# Patient Record
(demographics unavailable — no encounter records)

---

## 2024-11-18 NOTE — ASSESSMENT
[FreeTextEntry1] : 46 year old male found to have stable Hypertension, Hypercholesterolemia, Allergic Rhinosinusitis, Elevated LFTs, Fatty Liver, with the current prescription regimen as recommended, diet and lifestyle modifications, as counseled. Prior results reviewed, interpreted and discussed with the patient during today's examination, as appropriate. Follow up, treatment plan and tests, as ordered.   Total time spent:: 25 minutes Including: Preparation prior to visit - Reviewing prior record, results of tests and Consultation Reports as applicable Conducting an appropriate H & P during today's encounter Appropriate orders for tests, medications and procedures, as applicable Counseling patient Note completion

## 2024-11-18 NOTE — HISTORY OF PRESENT ILLNESS
[de-identified] : 46 year old  male patient with history of stable Hypertension, Hypercholesterolemia, Allergic Rhinosinusitis, Elevated LFTs, Fatty Liver, history as stated, presented for follow up examination. Patient is compliant with all medications. ROS as stated.

## 2024-11-18 NOTE — HISTORY OF PRESENT ILLNESS
[de-identified] : 46 year old  male patient with history of stable Hypertension, Hypercholesterolemia, Allergic Rhinosinusitis, Elevated LFTs, Fatty Liver, history as stated, presented for follow up examination. Patient is compliant with all medications. ROS as stated.

## 2024-11-18 NOTE — REVIEW OF SYSTEMS
[FreeTextEntry9] : Foot pain - POD F/U, as counseled. [TextEntry] : CARDIOVASCULAR: Negative RESPIRATORY: Negative GASTROINTESTINAL: Negative NEUROLOGICAL: Negative

## 2025-03-17 NOTE — HISTORY OF PRESENT ILLNESS
[de-identified] : 46 year old  male patient with history of stable Hypertension, Hypercholesterolemia, Allergic Rhinosinusitis, Elevated LFTs, Fatty Liver, history as stated, presented for follow up examination. Patient is compliant with all medications. ROS as stated.

## 2025-03-17 NOTE — HEALTH RISK ASSESSMENT
[Yes] : Yes [2 - 3 times a week (3 pts)] : 2 - 3  times a week (3 points) [1 or 2 (0 pts)] : 1 or 2 (0 points) [Never (0 pts)] : Never (0 points) [No] : In the past 12 months have you used drugs other than those required for medical reasons? No [No falls in past year] : Patient reported no falls in the past year [0] : 2) Feeling down, depressed, or hopeless: Not at all (0) [Never] : Never [de-identified] : None [Audit-CScore] : 3 [[id number]] : 0

## 2025-03-17 NOTE — HEALTH RISK ASSESSMENT
[Yes] : Yes [2 - 3 times a week (3 pts)] : 2 - 3  times a week (3 points) [1 or 2 (0 pts)] : 1 or 2 (0 points) [No] : In the past 12 months have you used drugs other than those required for medical reasons? No [Never (0 pts)] : Never (0 points) [No falls in past year] : Patient reported no falls in the past year [0] : 2) Feeling down, depressed, or hopeless: Not at all (0) [Never] : Never [de-identified] : None [Audit-CScore] : 3 [[id number]] : 0

## 2025-03-17 NOTE — HISTORY OF PRESENT ILLNESS
[de-identified] : 46 year old  male patient with history of stable Hypertension, Hypercholesterolemia, Allergic Rhinosinusitis, Elevated LFTs, Fatty Liver, history as stated, presented for follow up examination. Patient is compliant with all medications. ROS as stated.

## 2025-03-20 NOTE — REVIEW OF SYSTEMS
[Fatigue] : fatigue [Nasal Congestion] : nasal congestion [Postnasal Drip] : postnasal drip [Sinus Problems] : sinus problems [Seasonal Allergies] : seasonal allergies [Negative] : Endocrine

## 2025-03-20 NOTE — ASSESSMENT
[FreeTextEntry1] : ~age~yo man never smoker w/ PMH obesity, allergic rhinitis, OSWALDO not on PAP referred by PMD for evaluation for sleep issues.  Data Reviewed: PSG (5/2024) - mild OSWALDO; AHI 7.6, t90% 0.6, [id number] 82%  Impression: #OSWALDO #Post-nasal drip  Sleep symptoms are probably a combination of known OSWALDO as well as poor overall sleep quality, insufficient sleep duration, and poor sleep habits. Measured AHI on PSG 5/2024 may be worse now since pt has gained at least 15lbs already with increased tissue around neck. For these reasons and especially weight gain and other symptoms, meets indication for treatment of mild OSWALDO with PAP.  Plan: - patient was thoroughly counseled regarding sleep apnea, including diagnostic and treatment modalities, and the risks of untreated OSWALDO including increased risk of cardiovascular disease, myocardial infarction, neurovascular disease/stroke, metabolic syndrome, and impaired vigilance - start APAP for OSWALDO; DME requisition ordered - will need 4-6wk f/u visit after starting PAP to review compliance data and acclimatization  - trial of Azelastine 1% nasal spray 1 spray both nostrils BID for at least 4-8wks for PND - advised he needs to practice better sleep hygiene and increase total nightly sleep duration, regardless of PAP therapy - advised routine f/u with ENT regarding deviated septum, especially if snoring not improved with PAP  RTO 4-6wks after starting PAP

## 2025-03-20 NOTE — HISTORY OF PRESENT ILLNESS
[TextBox_4] : ~age~yo man never smoker w/ PMH obesity, allergic rhinitis, OSWALDO not on PAP referred by PMD for evaluation for sleep issues.  Barely gets much sleep at night. Some of this is because he snores loudly and disrupts wife or housemates going to bed sooner than them. Other times feels he is woken up by his own snoring and just poor sleep habits in general. Has deviated septum and nasal issues he saw ENT for in the past. Left side is his problem. Tried nasal sprays (flonase) without much help. Also had sleep study done by ENT last year but never followed up about the results nor did he start PAP. Says he's gained about 15lbs since then and worried his sleep quality is worse in the last few months. Feels tired during the day almost every day. +AM dry mouth. Also has mucus globus sensation in throat and frequent throat clearing.   SH: Never smoker  PMD: Dr. Velasquez De Oliveira  [Lab] : lab [TextBox_100] : 05/24 [TextBox_108] : 7.6 [TextBox_112] : 0.6 [TextBox_116] : 82 [ESS] : 9

## 2025-03-20 NOTE — ASSESSMENT
[FreeTextEntry1] : ~age~yo man never smoker w/ PMH obesity, allergic rhinitis, OSWALDO not on PAP referred by PMD for evaluation for sleep issues.  Data Reviewed: PSG (5/2024) - mild OSWALDO; AHI 7.6, t90% 0.6, [id number] 82%  Impression: #OSWALDO #Post-nasal drip  Sleep symptoms are probably a combination of known OSWALDO as well as poor overall sleep quality, insufficient sleep duration, and poor sleep habits. Measured AHI on PSG 5/2024 may be worse now since pt has gained at least 15lbs already with increased tissue around neck. For these reasons and especially weight gain and other symptoms, meets indication for treatment of mild OSWALDO with PAP.  Plan: - patient was thoroughly counseled regarding sleep apnea, including diagnostic and treatment modalities, and the risks of untreated OSWALDO including increased risk of cardiovascular disease, myocardial infarction, neurovascular disease/stroke, metabolic syndrome, and impaired vigilance - start APAP for OSWALDO; DME requisition ordered - will need 4-6wk f/u visit after starting PAP to review compliance data and acclimatization  - trial of Azelastine 1% nasal spray 1 spray both nostrils BID for at least 4-8wks for PND - advised he needs to practice better sleep hygiene and increase total nightly sleep duration, regardless of PAP therapy - advised routine f/u with ENT regarding deviated septum, especially if snoring not improved with PAP  RTO 4-6wks after starting PAP